# Patient Record
Sex: FEMALE | Race: WHITE | NOT HISPANIC OR LATINO | Employment: FULL TIME | URBAN - METROPOLITAN AREA
[De-identification: names, ages, dates, MRNs, and addresses within clinical notes are randomized per-mention and may not be internally consistent; named-entity substitution may affect disease eponyms.]

---

## 2017-03-01 ENCOUNTER — OFFICE VISIT (OUTPATIENT)
Dept: GASTROENTEROLOGY | Facility: CLINIC | Age: 22
End: 2017-03-01

## 2017-03-01 VITALS
WEIGHT: 180 LBS | BODY MASS INDEX: 30.73 KG/M2 | DIASTOLIC BLOOD PRESSURE: 74 MMHG | HEIGHT: 64 IN | SYSTOLIC BLOOD PRESSURE: 116 MMHG

## 2017-03-01 DIAGNOSIS — R10.30 LOWER ABDOMINAL PAIN: ICD-10-CM

## 2017-03-01 DIAGNOSIS — R19.7 DIARRHEA, UNSPECIFIED TYPE: Primary | ICD-10-CM

## 2017-03-01 PROCEDURE — 99204 OFFICE O/P NEW MOD 45 MIN: CPT | Performed by: INTERNAL MEDICINE

## 2017-03-01 RX ORDER — CHOLECALCIFEROL (VITAMIN D3) 125 MCG
5 CAPSULE ORAL
Status: ON HOLD | COMMUNITY
End: 2017-06-12

## 2017-03-01 RX ORDER — ALPRAZOLAM 0.25 MG/1
0.25 TABLET ORAL 2 TIMES DAILY PRN
COMMUNITY

## 2017-03-01 RX ORDER — FLUOXETINE HYDROCHLORIDE 20 MG/1
20 CAPSULE ORAL DAILY
COMMUNITY

## 2017-03-01 NOTE — PROGRESS NOTES
Chief Complaint   Patient presents with   • Abdominal Pain   • Diarrhea     urgency        History of Present Illness: She had periumbilical pain and loose bowels that started before her wedding in . Now no abdominal pain but she is having frequently and with urgency.   Now 2-3 or more BM/day.  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 + nauusea but no vomiting. No fevers, chills, night sweats. She has gained weight. Last antibiotics > 1 yr ago.  No foreign travel.  Her Mom has crohn's disease. LMP late ; . No association of any of her GI c/o with her menses.    Past Medical History   Diagnosis Date   • Anxiety    • Depression        Past Surgical History   Procedure Laterality Date   • Tonsillectomy           Current Outpatient Prescriptions:   •  ALPRAZolam (XANAX) 0.25 MG tablet, Take 0.25 mg by mouth 2 (Two) Times a Day As Needed for anxiety., Disp: , Rfl:   •  FLUoxetine (PROzac) 20 MG capsule, Take 20 mg by mouth Daily., Disp: , Rfl:   •  hyoscyamine (LEVSIN) 0.125 MG SL tablet, TK 1 T PO Q 4 H PRF CRAMPING OR DIARRHEA, Disp: , Rfl: 1  •  melatonin 5 MG tablet tablet, Take 5 mg by mouth., Disp: , Rfl:     Allergies   Allergen Reactions   • Biaxin [Clarithromycin]        Family History   Problem Relation Age of Onset   • Crohn's disease Mother        Social History     Social History   • Marital status:      Spouse name: N/A   • Number of children: N/A   • Years of education: N/A     Occupational History   • Not on file.     Social History Main Topics   • Smoking status: Never Smoker   • Smokeless tobacco: Never Used   • Alcohol use Yes   • Drug use: No   • Sexual activity: Not on file     Other Topics Concern   • Not on file     Social History Narrative   • No narrative on file       Review of Systems   Gastrointestinal: Positive for abdominal pain and diarrhea.   All other systems reviewed and are negative.      Vitals:     03/01/17 1342   BP: 116/74       Physical Exam   Constitutional: She is oriented to person, place, and time. She appears well-developed and well-nourished. No distress.   HENT:   Head: Normocephalic and atraumatic. Hair is normal.   Right Ear: Hearing, tympanic membrane, external ear and ear canal normal. No drainage. No decreased hearing is noted.   Left Ear: Hearing, tympanic membrane, external ear and ear canal normal. No decreased hearing is noted.   Nose: No nasal deformity.   Mouth/Throat: Oropharynx is clear and moist.   Eyes: Conjunctivae, EOM and lids are normal. Pupils are equal, round, and reactive to light. Right eye exhibits no discharge. Left eye exhibits no discharge.   Neck: Normal range of motion. Neck supple. No JVD present. No tracheal deviation present. No thyromegaly present.   Cardiovascular: Normal rate, regular rhythm, normal heart sounds, intact distal pulses and normal pulses.  Exam reveals no gallop and no friction rub.    No murmur heard.  Pulmonary/Chest: Effort normal and breath sounds normal. No respiratory distress. She has no wheezes. She has no rales. She exhibits no tenderness.   Abdominal: Soft. Bowel sounds are normal. She exhibits no distension and no mass. There is no tenderness. There is no rebound and no guarding. No hernia.   Genitourinary: Rectal exam shows guaiac negative stool.   Genitourinary Comments: Normal anorectal exam.   Musculoskeletal: Normal range of motion. She exhibits no edema, tenderness or deformity.   Lymphadenopathy:     She has no cervical adenopathy.   Neurological: She is alert and oriented to person, place, and time. She has normal reflexes. She displays normal reflexes. No cranial nerve deficit. She exhibits normal muscle tone. Coordination normal.   Skin: Skin is warm and dry. No rash noted. She is not diaphoretic. No erythema.   Psychiatric: She has a normal mood and affect. Her behavior is normal. Judgment and thought content normal.   Vitals  reviewed.      Neva was seen today for abdominal pain and diarrhea.    Diagnoses and all orders for this visit:    Diarrhea, unspecified type  -     Clostridium Difficile Toxin  -     Ova & Parasite Examination  -     Stool Culture  -     Calprotectin, Fecal  -     Fecal Fat, Qualitative  -     Fecal Leukocytes    Lower abdominal pain  -     Clostridium Difficile Toxin  -     Ova & Parasite Examination  -     Stool Culture  -     Calprotectin, Fecal  -     Fecal Fat, Qualitative  -     Fecal Leukocytes       Assessment:  1) Diarrhea  2) Periumbilical pain  3) FH (mom) crohn's disease.    Recommendations:  1)  Stool studies  2) Labs: to be done by her PCP tomorrow: CBC, CMP, TSH, UA, celiac sprue antibody panel.  3) The patient will call me one week after handing in her stool studies. If the tests are unrevealing then consider a c/s.      No Follow-up on file.

## 2017-03-13 ENCOUNTER — TELEPHONE (OUTPATIENT)
Dept: GASTROENTEROLOGY | Facility: CLINIC | Age: 22
End: 2017-03-13

## 2017-03-13 NOTE — TELEPHONE ENCOUNTER
----- Message from Eugenia Zuleta sent at 3/13/2017  9:15 AM EDT -----  Regarding: ORDER FOR STOOLS   Contact: [phone]  PT CALLED NEED ORDER FOR STOOL AND NEED TO FAX TO # [phone]

## 2017-03-13 NOTE — TELEPHONE ENCOUNTER
Stool orders faxed to number requested [phone] and fax confirmation received and scanned under media tab.

## 2017-03-22 ENCOUNTER — TELEPHONE (OUTPATIENT)
Dept: GASTROENTEROLOGY | Facility: CLINIC | Age: 22
End: 2017-03-22

## 2017-03-22 NOTE — TELEPHONE ENCOUNTER
Please tell her that all of her stool studies looked good.  I thought that her primary care doctor was going to be doing lab work on her after I saw her in the office.  Could we get the results of her lab work.  Also could we schedule her for a colonoscopy as an outpatient.  If she is okay with scheduling colonoscopy please order and I will cosign it.  I would like it to be done before the end of the month.

## 2017-03-22 NOTE — TELEPHONE ENCOUNTER
----- Message from Eugenia Zuleta sent at 3/22/2017  8:20 AM EDT -----  Regarding: STOOL RESULTS   Contact: [phone]  PT CALLED FOR STOOL STUDIES REPORT. PT FAX ON 3-20-17

## 2017-03-22 NOTE — TELEPHONE ENCOUNTER
Call to pt.  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  Request pt  fax to [phone] - awaiting results.

## 2017-03-23 ENCOUNTER — PREP FOR SURGERY (OUTPATIENT)
Dept: GASTROENTEROLOGY | Facility: CLINIC | Age: 22
End: 2017-03-23

## 2017-03-23 DIAGNOSIS — R10.13 DYSPEPSIA: Primary | ICD-10-CM

## 2017-03-23 DIAGNOSIS — R19.7 DIARRHEA, UNSPECIFIED TYPE: ICD-10-CM

## 2017-03-23 RX ORDER — SODIUM CHLORIDE 0.9 % (FLUSH) 0.9 %
1-10 SYRINGE (ML) INJECTION AS NEEDED
Status: CANCELLED | OUTPATIENT
Start: 2017-03-23

## 2017-03-23 NOTE — TELEPHONE ENCOUNTER
Please tell her that the lab work done at her PCPs office came back looking good.  Also please schedule her for an EGD and a colonoscopy to be done as an outpatient.  I ordered this in Epic.  Thanks

## 2017-03-23 NOTE — TELEPHONE ENCOUNTER
Call to pt.  Advise per Dr Gonzalez that all of her stool studies looked good.  Dr Gonzalez would like to proceed with c/s.  Pt agreeable and reports that PCP has placed on 3 mo's trial of Lansoprazole for GERD, and so is recommending an EGD along with c/s.    Additional blood work from PCP under Media Tab dated 3/10/17, titled [facility] R....    Message to Dr Gonzalez re: adding EGD to c/s.

## 2017-03-24 NOTE — TELEPHONE ENCOUNTER
Call to pt.  Advise per DR Gonzalez that the lab work done at PCP's office came back looking good.  EGD and c/s have been ordered.  Advise pt she will hear from Scheduling to arrange this. Pt verb understanding.

## 2017-04-18 ENCOUNTER — TELEPHONE (OUTPATIENT)
Dept: GASTROENTEROLOGY | Facility: CLINIC | Age: 22
End: 2017-04-18

## 2017-04-18 NOTE — TELEPHONE ENCOUNTER
Whether or not FMLA papers will be completed by Dr. Gonzalez is up to him since pt has only been evaluated once.  I am not sure what our office policy is when the patient is only been seen one time.  If she could have those completed by her PCP, or whoever has seen her more frequently, that might be more appropriate with only one office visit and our work up not being complete as c-scope has not been completed yet.  Refilling hyoscyamine is not a problem and we can certainly do that until her evaluation is complete with a colonoscopy to determine if additional medications are needed based upon endoscopic findings

## 2017-04-18 NOTE — TELEPHONE ENCOUNTER
Called pt and advised per  SHERLYN Coronado Np that it will be up to Dr Gonzalez whether or not he will fill out fmla papers due to she has only been seen in office once.  Advised the pt will refill her hyoscyamine to get her by till her scope  Advised she may want to see if her pcp can fill out the fmla papers.  Pt verb understanding.

## 2017-04-18 NOTE — TELEPHONE ENCOUNTER
----- Message from Neva Tijerina sent at 4/18/2017 10:56 AM EDT -----  Regarding: Visit Follow-Up Question  Contact: [phone]  Hello,   Due to my abdominal pain and diarrhea, I have had to miss several days of work. I would like to discuss possible FMLA for this. I do not know if I should wait until after my colonoscopy to request this, but since my colonoscopy is not until June I wanted to request this now. I am constantly either coming in late or calling in due to abdominal pain and diarrhea. If someone could call me or message me I would appreciate it.   Also, my primary care office prescribed me Hyoscyamine 0.125 mg sublingual tablets PRN for my abdominal pain and diarrhea. I will need a refill on this soon and since I am now seeing Dr. Gonzalez for these issues, Dr. Quiroga prefers that I request a refill of this from Dr. Gonzalez.   Please contact me via [employer] or cell [phone]  Thank you.  Neva Tijerina

## 2017-05-03 ENCOUNTER — TELEPHONE (OUTPATIENT)
Dept: GASTROENTEROLOGY | Facility: CLINIC | Age: 22
End: 2017-05-03

## 2017-06-12 ENCOUNTER — HOSPITAL ENCOUNTER (OUTPATIENT)
Facility: HOSPITAL | Age: 22
Setting detail: HOSPITAL OUTPATIENT SURGERY
Discharge: HOME | End: 2017-06-12
Attending: INTERNAL MEDICINE | Admitting: INTERNAL MEDICINE

## 2017-06-12 ENCOUNTER — ANESTHESIA EVENT (OUTPATIENT)
Dept: GASTROENTEROLOGY | Facility: HOSPITAL | Age: 22
End: 2017-06-12

## 2017-06-12 ENCOUNTER — ANESTHESIA (OUTPATIENT)
Dept: GASTROENTEROLOGY | Facility: HOSPITAL | Age: 22
End: 2017-06-12

## 2017-06-12 VITALS
RESPIRATION RATE: 20 BRPM | HEART RATE: 64 BPM | SYSTOLIC BLOOD PRESSURE: 110 MMHG | BODY MASS INDEX: 32.44 KG/M2 | WEIGHT: 190 LBS | TEMPERATURE: 98.2 F | HEIGHT: 64 IN | DIASTOLIC BLOOD PRESSURE: 72 MMHG | OXYGEN SATURATION: 98 %

## 2017-06-12 DIAGNOSIS — R10.13 DYSPEPSIA: ICD-10-CM

## 2017-06-12 DIAGNOSIS — R19.7 DIARRHEA, UNSPECIFIED TYPE: ICD-10-CM

## 2017-06-12 LAB
B-HCG UR QL: NEGATIVE
INTERNAL NEGATIVE CONTROL: NEGATIVE
INTERNAL POSITIVE CONTROL: POSITIVE
Lab: NORMAL

## 2017-06-12 PROCEDURE — 88305 TISSUE EXAM BY PATHOLOGIST: CPT | Performed by: INTERNAL MEDICINE

## 2017-06-12 PROCEDURE — 87081 CULTURE SCREEN ONLY: CPT | Performed by: INTERNAL MEDICINE

## 2017-06-12 PROCEDURE — 25010000002 PROPOFOL 10 MG/ML EMULSION: Performed by: ANESTHESIOLOGY

## 2017-06-12 PROCEDURE — 43239 EGD BIOPSY SINGLE/MULTIPLE: CPT | Performed by: INTERNAL MEDICINE

## 2017-06-12 PROCEDURE — 88312 SPECIAL STAINS GROUP 1: CPT | Performed by: INTERNAL MEDICINE

## 2017-06-12 PROCEDURE — 45380 COLONOSCOPY AND BIOPSY: CPT | Performed by: INTERNAL MEDICINE

## 2017-06-12 RX ORDER — LIDOCAINE HYDROCHLORIDE 20 MG/ML
INJECTION, SOLUTION INFILTRATION; PERINEURAL AS NEEDED
Status: DISCONTINUED | OUTPATIENT
Start: 2017-06-12 | End: 2017-06-12 | Stop reason: SURG

## 2017-06-12 RX ORDER — SODIUM CHLORIDE 0.9 % (FLUSH) 0.9 %
1-10 SYRINGE (ML) INJECTION AS NEEDED
Status: DISCONTINUED | OUTPATIENT
Start: 2017-06-12 | End: 2017-06-12 | Stop reason: HOSPADM

## 2017-06-12 RX ORDER — SODIUM CHLORIDE 0.9 % (FLUSH) 0.9 %
3 SYRINGE (ML) INJECTION AS NEEDED
Status: DISCONTINUED | OUTPATIENT
Start: 2017-06-12 | End: 2017-06-12 | Stop reason: HOSPADM

## 2017-06-12 RX ORDER — PROPOFOL 10 MG/ML
VIAL (ML) INTRAVENOUS AS NEEDED
Status: DISCONTINUED | OUTPATIENT
Start: 2017-06-12 | End: 2017-06-12 | Stop reason: SURG

## 2017-06-12 RX ORDER — LIDOCAINE HYDROCHLORIDE 10 MG/ML
0.5 INJECTION, SOLUTION INFILTRATION; PERINEURAL ONCE AS NEEDED
Status: DISCONTINUED | OUTPATIENT
Start: 2017-06-12 | End: 2017-06-12 | Stop reason: HOSPADM

## 2017-06-12 RX ORDER — PROPOFOL 10 MG/ML
VIAL (ML) INTRAVENOUS CONTINUOUS PRN
Status: DISCONTINUED | OUTPATIENT
Start: 2017-06-12 | End: 2017-06-12 | Stop reason: SURG

## 2017-06-12 RX ORDER — SODIUM CHLORIDE, SODIUM LACTATE, POTASSIUM CHLORIDE, CALCIUM CHLORIDE 600; 310; 30; 20 MG/100ML; MG/100ML; MG/100ML; MG/100ML
1000 INJECTION, SOLUTION INTRAVENOUS CONTINUOUS PRN
Status: DISCONTINUED | OUTPATIENT
Start: 2017-06-12 | End: 2017-06-12 | Stop reason: HOSPADM

## 2017-06-12 RX ORDER — NORETHINDRONE ACETATE AND ETHINYL ESTRADIOL .03; 1.5 MG/1; MG/1
TABLET ORAL DAILY
COMMUNITY

## 2017-06-12 RX ADMIN — LIDOCAINE HYDROCHLORIDE 100 MG: 20 INJECTION, SOLUTION INFILTRATION; PERINEURAL at 17:08

## 2017-06-12 RX ADMIN — PROPOFOL 100 MG: 10 INJECTION, EMULSION INTRAVENOUS at 17:14

## 2017-06-12 RX ADMIN — SODIUM CHLORIDE, POTASSIUM CHLORIDE, SODIUM LACTATE AND CALCIUM CHLORIDE: 600; 310; 30; 20 INJECTION, SOLUTION INTRAVENOUS at 17:02

## 2017-06-12 RX ADMIN — PROPOFOL 200 MG: 10 INJECTION, EMULSION INTRAVENOUS at 17:08

## 2017-06-12 RX ADMIN — SODIUM CHLORIDE, POTASSIUM CHLORIDE, SODIUM LACTATE AND CALCIUM CHLORIDE 1000 ML: 600; 310; 30; 20 INJECTION, SOLUTION INTRAVENOUS at 15:47

## 2017-06-12 RX ADMIN — PROPOFOL 100 MG: 10 INJECTION, EMULSION INTRAVENOUS at 17:11

## 2017-06-12 RX ADMIN — PROPOFOL 180 MCG/KG/MIN: 10 INJECTION, EMULSION INTRAVENOUS at 17:08

## 2017-06-12 NOTE — H&P
Chief Complaint   Patient presents with   • Abdominal Pain   • Diarrhea       urgency          History of Present Illness: She had periumbilical pain and loose bowels that started before her wedding in . Now no abdominal pain but she is having frequently and with urgency. Now 2-3 or more BM/day. 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 + nauusea but no vomiting. No fevers, chills, night sweats. She has gained weight. Last antibiotics > 1 yr ago. No foreign travel. Her Mom has crohn's disease. LMP late ; . No association of any of her GI c/o with her menses.      Medical History         Past Medical History   Diagnosis Date   • Anxiety     • Depression               Surgical History          Past Surgical History   Procedure Laterality Date   • Tonsillectomy                   Current Outpatient Prescriptions:   • ALPRAZolam (XANAX) 0.25 MG tablet, Take 0.25 mg by mouth 2 (Two) Times a Day As Needed for anxiety., Disp: , Rfl:   • FLUoxetine (PROzac) 20 MG capsule, Take 20 mg by mouth Daily., Disp: , Rfl:   • hyoscyamine (LEVSIN) 0.125 MG SL tablet, TK 1 T PO Q 4 H PRF CRAMPING OR DIARRHEA, Disp: , Rfl: 1  • melatonin 5 MG tablet tablet, Take 5 mg by mouth., Disp: , Rfl:           Allergies   Allergen Reactions   • Biaxin [Clarithromycin]                 Family History   Problem Relation Age of Onset   • Crohn's disease Mother            Social History    Social History            Social History   • Marital status:        Spouse name: N/A   • Number of children: N/A   • Years of education: N/A          Occupational History   • Not on file.           Social History Main Topics   • Smoking status: Never Smoker   • Smokeless tobacco: Never Used   • Alcohol use Yes   • Drug use: No   • Sexual activity: Not on file           Other Topics Concern   • Not on file      Social History Narrative   • No narrative on file            Review of Systems    Gastrointestinal: Positive for abdominal pain and diarrhea.   All other systems reviewed and are negative.            Vitals:     03/01/17 1342   BP: 116/74         Physical Exam   Constitutional: She is oriented to person, place, and time. She appears well-developed and well-nourished. No distress.   HENT:   Head: Normocephalic and atraumatic. Hair is normal.   Right Ear: Hearing, tympanic membrane, external ear and ear canal normal. No drainage. No decreased hearing is noted.   Left Ear: Hearing, tympanic membrane, external ear and ear canal normal. No decreased hearing is noted.   Nose: No nasal deformity.   Mouth/Throat: Oropharynx is clear and moist.   Eyes: Conjunctivae, EOM and lids are normal. Pupils are equal, round, and reactive to light. Right eye exhibits no discharge. Left eye exhibits no discharge.   Neck: Normal range of motion. Neck supple. No JVD present. No tracheal deviation present. No thyromegaly present.   Cardiovascular: Normal rate, regular rhythm, normal heart sounds, intact distal pulses and normal pulses. Exam reveals no gallop and no friction rub.   No murmur heard.  Pulmonary/Chest: Effort normal and breath sounds normal. No respiratory distress. She has no wheezes. She has no rales. She exhibits no tenderness.   Abdominal: Soft. Bowel sounds are normal. She exhibits no distension and no mass. There is no tenderness. There is no rebound and no guarding. No hernia.   Genitourinary: Rectal exam shows guaiac negative stool.   Genitourinary Comments: Normal anorectal exam.   Musculoskeletal: Normal range of motion. She exhibits no edema, tenderness or deformity.   Lymphadenopathy:   She has no cervical adenopathy.   Neurological: She is alert and oriented to person, place, and time. She has normal reflexes. She displays normal reflexes. No cranial nerve deficit. She exhibits normal muscle tone. Coordination normal.   Skin: Skin is warm and dry. No rash noted. She is not diaphoretic. No  erythema.   Psychiatric: She has a normal mood and affect. Her behavior is normal. Judgment and thought content normal.   Vitals reviewed.        Neva was seen today for abdominal pain and diarrhea.     Diagnoses and all orders for this visit:     Diarrhea, unspecified type  - Clostridium Difficile Toxin  - Ova & Parasite Examination  - Stool Culture  - Calprotectin, Fecal  - Fecal Fat, Qualitative  - Fecal Leukocytes     Lower abdominal pain  - Clostridium Difficile Toxin  - Ova & Parasite Examination  - Stool Culture  - Calprotectin, Fecal  - Fecal Fat, Qualitative  - Fecal Leukocytes        Assessment:  1) Diarrhea  2) Periumbilical pain  3) FH (mom) crohn's disease.     Recommendations:  1) Stool studies  2) Labs: to be done by her PCP tomorrow: CBC, CMP, TSH, UA, celiac sprue antibody panel.  3) The patient will call me one week after handing in her stool studies. If the tests are unrevealing then consider a c/s.     6/12/17 - No change from the above H and P. Frandy Gonzalez M.D.

## 2017-06-12 NOTE — ANESTHESIA POSTPROCEDURE EVALUATION
Patient: Neva Tijerina    Procedure Summary     Date Anesthesia Start Anesthesia Stop Room / Location    06/12/17 1702 0616  [facility] /  [facility]       Procedure Diagnosis Surgeon Provider    ESOPHAGOGASTRODUODENOSCOPY with biopsies (N/A Esophagus); COLONOSCOPY into cecum and T.I. with biopsies (N/A ) Dyspepsia; Diarrhea, unspecified type  (Dyspepsia [R10.13]; Diarrhea, unspecified type [R19.7]) MD Wolfgang Mccray MD          Anesthesia Type: MAC  Last vitals  BP      Temp      Pulse     Resp      SpO2        Post Anesthesia Care and Evaluation    Patient location during evaluation: PACU  Patient participation: complete - patient participated  Level of consciousness: awake and alert  Pain score: 0  Pain management: adequate  Airway patency: patent  Anesthetic complications: No anesthetic complications    Cardiovascular status: acceptable  Respiratory status: acceptable  Hydration status: acceptable

## 2017-06-12 NOTE — DISCHARGE INSTRUCTIONS
For the next 24 hours patient needs to be with a responsible adult.    For 24 hours DO NOT drive, operate machinery, appliances, drink alcohol, make important decisions or sign legal documents.    Start with a light or bland diet and advance to regular diet as tolerated.    Follow recommendations on procedure report provided by your doctor.    Call Dr Gonzalez for problems [phone]. Call for pathology results in 2 weeks.    Problems may include but not limited to: large amounts of bleeding, trouble breathing, repeated vomiting, severe unrelieved pain, fever or chills.

## 2017-06-13 LAB — UREASE TISS QL: NEGATIVE

## 2017-06-14 LAB
CYTO UR: NORMAL
LAB AP CASE REPORT: NORMAL
Lab: NORMAL
PATH REPORT.FINAL DX SPEC: NORMAL
PATH REPORT.GROSS SPEC: NORMAL

## 2017-06-16 ENCOUNTER — TELEPHONE (OUTPATIENT)
Dept: GASTROENTEROLOGY | Facility: CLINIC | Age: 22
End: 2017-06-16

## 2017-06-16 NOTE — TELEPHONE ENCOUNTER
----- Message from Neva Tijerina sent at 6/16/2017  1:56 PM EDT -----  Regarding: Visit Follow-Up Question  Contact: [phone]  Hello,     I saw my results were on MyChart from my Colonoscopy 6/12/17. Can someone please call me once Dr. Gonzalez has reviewed these and let me know what he says? So far, what I am reading is that everything is normal, just some inflammation. So this is good but what does this mean? IBS? I have 4 week follow up with ROMI Rand on 7/10/17.     Thank you.     P.S: YAY NO CROHN'S! :)     -Neva Tijerina

## 2017-06-19 NOTE — PROGRESS NOTES
"Please tell her that pathology from the EGD looked okay.  There was minimal inflammation in the stomach.  The biopsies of the end of the small bowel and the colon in general looked okay.  The rectal biopsy showed \"mild architectural distortion\" which probably means nothing.  It rarely committed be seen in something like ulcerative colitis that is very much in remission.  If she wants to discuss with may have her see me in the office in follow-up."

## 2017-06-21 ENCOUNTER — TELEPHONE (OUTPATIENT)
Dept: GASTROENTEROLOGY | Facility: CLINIC | Age: 22
End: 2017-06-21

## 2017-06-21 NOTE — TELEPHONE ENCOUNTER
"----- Message from Frandy Gonzalez MD sent at 6/19/2017  8:24 AM EDT -----  Please tell her that pathology from the EGD looked okay.  There was minimal inflammation in the stomach.  The biopsies of the end of the small bowel and the colon in general looked okay.  The rectal biopsy showed \"mild architectural distortion\" which probably means nothing.  It rarely committed be seen in something like ulcerative colitis that is very much in remission.  If she wants to discuss with may have her see me in the office in follow-up.  "

## 2017-06-21 NOTE — TELEPHONE ENCOUNTER
Pt called and advised per SHALA Gonzalez that the path from the egd looked ok.  There were min inflammation in the stomach.  The bx of the end of the sm bowel and the colon in general looked ok.  The rectal bx showed mild architectural distortion which probably means nothing.  It rarely can be seen in something like ulcerative colitis that is very much in remission.  If she wants to discuss , she can f/u in office. Pt verb understanding and states she will keep her f/u in July.

## 2017-07-10 ENCOUNTER — OFFICE VISIT (OUTPATIENT)
Dept: GASTROENTEROLOGY | Facility: CLINIC | Age: 22
End: 2017-07-10

## 2017-07-10 VITALS
HEIGHT: 63 IN | BODY MASS INDEX: 34.55 KG/M2 | TEMPERATURE: 98.4 F | DIASTOLIC BLOOD PRESSURE: 66 MMHG | WEIGHT: 195 LBS | SYSTOLIC BLOOD PRESSURE: 118 MMHG

## 2017-07-10 DIAGNOSIS — K58.2 IRRITABLE BOWEL SYNDROME WITH BOTH CONSTIPATION AND DIARRHEA: Primary | ICD-10-CM

## 2017-07-10 PROCEDURE — 99213 OFFICE O/P EST LOW 20 MIN: CPT | Performed by: NURSE PRACTITIONER

## 2017-07-10 RX ORDER — LANSOPRAZOLE 30 MG/1
CAPSULE, DELAYED RELEASE ORAL
COMMUNITY
Start: 2017-05-09

## 2017-07-10 NOTE — PROGRESS NOTES
Chief Complaint   Patient presents with   • Abdominal Pain   • Constipation   • Diarrhea         HPI    22-year-old female patient of Dr. Gonzalez's last evaluated in the office in March 2017.  At that time seen for complaints of periodic umbilical pain, episodes of constipation alternating with diarrhea associated with frequency and urgency.  Mom has a history of Crohn's.  Stool studies were done and labs were obtained from the patient's primary care provider to include a CBC, CMP, UA, TSH and celiac panel which were all within normal limits.  Patient underwent both an EGD as well as a colonoscopy on Jenni 10 for evaluation of complaints and presents today for follow-up.    EGD with findings of gastritis with negative pathology.  Colonoscopy performed at the same time with findings of normal perianal and digital rectal exam, normal sphincter tone, normal terminal ileum, and normal-appearing colon.  Pathology was consistent with no celiac, H pylori and colon biopsies at the rectum showed mild architectural distortion with no inflammation, fibrosis ulceration or granuloma and no indication of IBD.    Patient continues with intermittent episodes of diarrhea alternating with constipation.  She reports her stools will be loose primarily when she is under a lot of stress.  She is attempting to manage her symptoms with healthful eating, increase in her activity and exercise regimen and stress management's last relaxation.  She is compliant with her Levsin taken on an as-needed basis as well as her lansoprazole.    Past Medical History:   Diagnosis Date   • Anxiety    • Depression    • GERD (gastroesophageal reflux disease)      Past Surgical History:   Procedure Laterality Date   • COLONOSCOPY N/A 6/12/2017    Procedure: COLONOSCOPY into cecum and T.I. with biopsies;  Surgeon: Frandy Gonzalez MD;  Location: [facility] ENDOSCOPY;  Service: mild architectural distortion,    • ENDOSCOPY N/A 6/12/2017    Procedure:  "ESOPHAGOGASTRODUODENOSCOPY with biopsies;  Surgeon: Frandy Gonzalez MD;  Location: [facility] ENDOSCOPY;  Service: chronic inflammation   • TONSILLECTOMY     • WISDOM TOOTH EXTRACTION         Current Outpatient Prescriptions   Medication Sig Dispense Refill   • ALPRAZolam (XANAX) 0.25 MG tablet Take 0.25 mg by mouth 2 (Two) Times a Day As Needed for anxiety.     • FLUoxetine (PROzac) 20 MG capsule Take 20 mg by mouth Daily.     • hyoscyamine (LEVSIN) 0.125 MG SL tablet Take 1 tablet by mouth Every 4 (Four) Hours As Needed for Cramping (cramping or diarrhea). 60 tablet 1   • lansoprazole (PREVACID) 30 MG capsule Every Other Day As Needed.     • Norethindrone Acet-Ethinyl Est (MICROGESTIN) 1.5-30 MG-MCG tablet Take  by mouth Daily.       No current facility-administered medications for this visit.        PRN Meds:.    Allergies   Allergen Reactions   • Biaxin [Clarithromycin]        Social History     Social History   • Marital status:      Spouse name: N/A   • Number of children: N/A   • Years of education: N/A     Occupational History   • Not on file.     Social History Main Topics   • Smoking status: Never Smoker   • Smokeless tobacco: Never Used   • Alcohol use 0.6 oz/week     1 Shots of liquor per week   • Drug use: No   • Sexual activity: Not on file     Other Topics Concern   • Not on file     Social History Narrative       Family History   Problem Relation Age of Onset   • Crohn's disease Mother        Review of Systems   Constitutional: Negative for activity change, appetite change and unexpected weight change.   Gastrointestinal: Positive for constipation and diarrhea. Negative for abdominal distention, abdominal pain, blood in stool, nausea and vomiting.       Vitals:    07/10/17 1334   BP: 118/66   Temp: 98.4 °F (36.9 °C)     /66  Temp 98.4 °F (36.9 °C)  Ht 63\" (160 cm)  Wt 195 lb (88.5 kg)  LMP 05/12/2017  BMI 34.54 kg/m2  Physical Exam   Constitutional: She is oriented to person, place, and " time. She appears well-developed and well-nourished. No distress.   HENT:   Head: Normocephalic and atraumatic.   Eyes: Left eye exhibits no discharge.   Cardiovascular: Normal rate and regular rhythm.    Pulmonary/Chest: Effort normal and breath sounds normal.   Abdominal: Soft. Bowel sounds are normal.   obese   Neurological: She is alert and oriented to person, place, and time.   Skin: Skin is warm and dry.   Psychiatric: She has a normal mood and affect.   Vitals reviewed.      ASSESSMENT AND PLAN    Neva was seen today for abdominal pain, constipation and diarrhea.    Diagnoses and all orders for this visit:    Irritable bowel syndrome with both constipation and diarrhea  Comments:  c-scope 6/2017 negative      Both EGD and colonoscopy as well as pathology were reviewed with the patient and her mother.  There is no indication of irritable bowel disease.  We discussed diagnosis of irritable bowel syndrome with mixed pattern as she has diarrhea alternating with constipation.  Treatment is symptom management, she is currently attempting to follow a more healthful diet as well as increase her activity level which will be beneficial.  We have discussed increasing dietary fiber and certainly management of stress levels which directly correlate with her IBS sxs.  Can continue the Levsin on an as-needed basis and take lansoprazole for mild GERD symptoms.  She will return to the office for routine follow-up in one year as we are prescribing medications.  She can contact us sooner if there are any concerns or changes from today's assessment        AMINAH Latif   [facility]  [address] [place] [address]  Office: [phone]

## 2018-03-07 ENCOUNTER — TELEPHONE (OUTPATIENT)
Dept: GASTROENTEROLOGY | Facility: CLINIC | Age: 23
End: 2018-03-07

## 2018-03-07 NOTE — TELEPHONE ENCOUNTER
----- Message from Neva Tijerina sent at 3/6/2018  6:04 PM EST -----  Regarding: Non-Urgent Medical Question  Contact: [phone]  Dr. Gonzalez or Ms. Rees,    I am currently 16 weeks pregnant and having issues with my IBS flaring up. My OB recommended that I ask you about Bentyl for my IBS while I am currently pregnant. I typically take Hycosamine but she is not sure about me taking that while pregnant. Is there any way you can prescribe me Bentyl for the time being? If so can you please send to [facility] on [address].

## 2018-03-08 NOTE — TELEPHONE ENCOUNTER
I called her yesterday and again today but could only leave a message. Why don't you work her in to see ROMI Hull. [email or web address]

## 2018-03-09 NOTE — TELEPHONE ENCOUNTER
Call to pt.  Advise that Dr Gonzalez has tried to contact without success - wants pt to be seen in office.    Pt verb understanding.  Requesting appt during spring break - scheduled for 4/2 @ 1:30 pm.

## 2018-04-02 ENCOUNTER — OFFICE VISIT (OUTPATIENT)
Dept: GASTROENTEROLOGY | Facility: CLINIC | Age: 23
End: 2018-04-02

## 2018-04-02 VITALS
BODY MASS INDEX: 32.1 KG/M2 | DIASTOLIC BLOOD PRESSURE: 62 MMHG | SYSTOLIC BLOOD PRESSURE: 102 MMHG | HEIGHT: 64 IN | WEIGHT: 188 LBS | TEMPERATURE: 98.6 F

## 2018-04-02 DIAGNOSIS — Z3A.20 20 WEEKS GESTATION OF PREGNANCY: ICD-10-CM

## 2018-04-02 DIAGNOSIS — K58.2 IRRITABLE BOWEL SYNDROME WITH BOTH CONSTIPATION AND DIARRHEA: Primary | ICD-10-CM

## 2018-04-02 PROCEDURE — 99213 OFFICE O/P EST LOW 20 MIN: CPT | Performed by: NURSE PRACTITIONER

## 2018-04-02 RX ORDER — METOCLOPRAMIDE 5 MG/1
TABLET ORAL
Refills: 0 | COMMUNITY
Start: 2018-03-12

## 2018-04-02 RX ORDER — PNV,CALCIUM 72/IRON/FOLIC ACID 27 MG-1 MG
TABLET ORAL
COMMUNITY
Start: 2017-12-31

## 2018-04-02 RX ORDER — CALCIUM CARBONATE 200(500)MG
1 TABLET,CHEWABLE ORAL DAILY
COMMUNITY

## 2018-04-02 RX ORDER — ACETAMINOPHEN 325 MG/1
650 TABLET ORAL EVERY 6 HOURS PRN
COMMUNITY

## 2018-04-02 RX ORDER — ONDANSETRON HYDROCHLORIDE 8 MG/1
TABLET, FILM COATED ORAL
Refills: 0 | COMMUNITY
Start: 2018-03-09

## 2018-04-02 NOTE — PROGRESS NOTES
Chief Complaint   Patient presents with   • Irritable Bowel Syndrome     pt is pregnant       Neva Tijerina is a  22 y.o. female here for a follow up visit for IBS.    HPI  21 yo f presents today for follow up visit for IBS-mixed. She is a patient of Dr. Gonzalez. She is currently 20 weeks pregnant. She is accompanied by her . She normally reports issues with IBS and diarrhea but since she has been pregnant she is having issues with constipation. She has tried stool softeners but admits she has not taken them regularly. She has been to the ER with impaction and had to have enemas and fluids. She admits she has to strain a lot now to have Bm. She denies any dysphagia, abd pain, N&V, diarrhea, rectal bleeding or melena.     Past Medical History:   Diagnosis Date   • Anxiety    • Depression    • GERD (gastroesophageal reflux disease)        Past Surgical History:   Procedure Laterality Date   • COLONOSCOPY N/A 6/12/2017    normal   • ENDOSCOPY N/A 6/12/2017    Erythema in stomach   • TONSILLECTOMY     • WISDOM TOOTH EXTRACTION         Scheduled Meds:    Continuous Infusions:  No current facility-administered medications for this visit.     PRN Meds:.    Allergies   Allergen Reactions   • Biaxin [Clarithromycin]    • Promethazine Irritability       Social History     Social History   • Marital status:      Spouse name: N/A   • Number of children: N/A   • Years of education: N/A     Occupational History   • Not on file.     Social History Main Topics   • Smoking status: Never Smoker   • Smokeless tobacco: Never Used   • Alcohol use No   • Drug use: No   • Sexual activity: Not on file     Other Topics Concern   • Not on file     Social History Narrative   • No narrative on file       Family History   Problem Relation Age of Onset   • Crohn's disease Mother        Review of Systems   Constitutional: Negative for appetite change, chills, diaphoresis, fatigue, fever and unexpected weight change.   HENT: Negative  for nosebleeds, postnasal drip, sore throat, trouble swallowing and voice change.    Respiratory: Negative for cough, choking, chest tightness, shortness of breath and wheezing.    Cardiovascular: Negative for chest pain.   Gastrointestinal: Positive for constipation. Negative for abdominal distention, abdominal pain, anal bleeding, blood in stool, diarrhea, nausea, rectal pain and vomiting.   Endocrine: Negative for polydipsia, polyphagia and polyuria.   Musculoskeletal: Negative for gait problem.   Skin: Negative for rash and wound.   Allergic/Immunologic: Negative for food allergies.   Neurological: Negative for dizziness, speech difficulty and light-headedness.   Psychiatric/Behavioral: Negative for confusion, self-injury, sleep disturbance and suicidal ideas.       Vitals:    04/02/18 1337   BP: 102/62   Temp: 98.6 °F (37 °C)       Physical Exam   Constitutional: She is oriented to person, place, and time. She appears well-developed and well-nourished. She does not appear ill. No distress.   HENT:   Head: Normocephalic.   Eyes: Pupils are equal, round, and reactive to light.   Cardiovascular: Normal rate, regular rhythm and normal heart sounds.    Pulmonary/Chest: Effort normal and breath sounds normal.   Abdominal: Soft. Bowel sounds are normal. She exhibits no distension and no mass. There is no hepatosplenomegaly. There is no tenderness. There is no rebound and no guarding. No hernia.   Musculoskeletal: Normal range of motion.   Neurological: She is alert and oriented to person, place, and time.   Skin: Skin is warm and dry.   Psychiatric: She has a normal mood and affect. Her speech is normal and behavior is normal. Judgment normal.       No images are attached to the encounter.    Assessment & Plan    1. Irritable bowel syndrome with both constipation and diarrhea    2. 20 weeks gestation of pregnancy    I advised patient to try all natural remedies to help with constipation like increased water, vegetable  based stool softeners, prunes or prune juice or increased fiber. She can discuss with her OBGYN about possibly trying dulcolax stool softeners or miralax. I will also give her some samples of IBGARD to try. Follow up with her OBGYN as planned. Follow up with Dr. Gonzalez as needed.

## 2019-08-29 ENCOUNTER — TRANSCRIBE ORDERS (OUTPATIENT)
Dept: PHYSICAL THERAPY | Facility: CLINIC | Age: 24
End: 2019-08-29

## 2019-08-29 DIAGNOSIS — M54.5 LOW BACK PAIN, UNSPECIFIED BACK PAIN LATERALITY, UNSPECIFIED CHRONICITY, WITH SCIATICA PRESENCE UNSPECIFIED: Primary | ICD-10-CM

## 2019-09-06 ENCOUNTER — OFFICE VISIT (OUTPATIENT)
Dept: PHYSICAL THERAPY | Facility: CLINIC | Age: 24
End: 2019-09-06

## 2019-09-06 DIAGNOSIS — M54.5 LOW BACK PAIN, UNSPECIFIED BACK PAIN LATERALITY, UNSPECIFIED CHRONICITY, WITH SCIATICA PRESENCE UNSPECIFIED: Primary | ICD-10-CM

## 2019-09-06 PROCEDURE — 97014 ELECTRIC STIMULATION THERAPY: CPT | Performed by: PHYSICAL THERAPIST

## 2019-09-06 PROCEDURE — 97161 PT EVAL LOW COMPLEX 20 MIN: CPT | Performed by: PHYSICAL THERAPIST

## 2019-09-06 PROCEDURE — 97110 THERAPEUTIC EXERCISES: CPT | Performed by: PHYSICAL THERAPIST

## 2019-09-06 NOTE — PROGRESS NOTES
Physical Therapy Initial Evaluation and Plan of Care        Subjective Evaluation    History of Present Illness  Date of surgery: 2019  Mechanism of injury: Pt is 23 yo female with onset of back pain after lifting a 200# patient from the floor.  She has complaints of middle upper back pain with leaning over, bending, twisting. No sleep disturbance at night.      Subjective comment: Pt reports she is doing better.   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    Precautions and Work Restrictions: on light dutyQuality of life: good    Pain  Current pain ratin  At best pain ratin  At worst pain ratin  Location: Mid back  Quality: dull ache, tight and discomfort  Relieving factors: medications, rest, change in position and ice  Aggravating factors: lifting, movement, prolonged positioning and outstretched reach (bending, twisting)    Social Support  Lives in: multiple-level home  Lives with: young children and spouse    Hand dominance: right    Treatments  Previous treatment: medication  Current treatment: medication  Patient Goals  Patient goals for therapy: decreased pain, increased motion, increased strength, return to sport/leisure activities and return to work             Objective       Static Posture     Head  Forward.    Shoulders  Rounded.    Thoracic Spine  Hyperkyphosis.    Lumbar Spine   Decreased lordosis.     Knee   Genu valgus.     Palpation   Left   No palpable tenderness to the erector spinae, iliopsoas and quadratus lumborum.   Muscle spasm in the cervical paraspinals, lower trapezius, middle trapezius and rhomboids.   Tenderness of the cervical paraspinals, lower trapezius, middle trapezius and rhomboids.     Right   No palpable tenderness to the erector spinae, iliopsoas and quadratus lumborum.   Muscle spasm in the cervical paraspinals, lower trapezius, middle trapezius and rhomboids. Tenderness of the cervical paraspinals, lower trapezius, middle trapezius and rhomboids.      Active Range of Motion     Additional Active Range of Motion Details  Cervical ROM is 100% of normal  Lumbar AROM %  Flexion 50%  Extension 50% with sharp mid back pain  Right Lateral Flexion 50%  Left Lateral Flexion 50%      Strength/Myotome Testing     Left Shoulder     Planes of Motion   Flexion: 5   Extension: 5   Abduction: 5   External rotation at 0°: 5   Internal rotation at 0°: 5     Right Shoulder     Planes of Motion   Flexion: 5   Extension: 5   Abduction: 5   External rotation at 0°: 5   Internal rotation at 0°: 5     Left Elbow   Flexion: 5  Extension: 5    Right Elbow   Flexion: 5  Extension: 5    Left Wrist/Hand   Wrist flexion: 5    Right Wrist/Hand   Wrist flexion: 5    Left Hip   Planes of Motion   Flexion: 5  Abduction: 5    Right Hip   Planes of Motion   Flexion: 5  Abduction: 5    Left Knee   Flexion: 5  Extension: 5    Right Knee   Flexion: 5  Extension: 5    Left Ankle/Foot   Plantar flexion: 5    Right Ankle/Foot   Plantar flexion: 5         Assessment & Plan     Assessment  Impairments: abnormal muscle tone, abnormal or restricted ROM, activity intolerance and pain with function  Assessment details: Neva Tijerina is a 24 y.o. year-old female referred to physical therapy for mid back pain from lifting a patient from the floor at work. She presents with a stable clinical presentation.  She has no  comorbidities or personal factors that may affect her progress in the plan of care.  She presents with moderate spasm in mid to lower thoracic paraspinals, mid traps, and rhomboids. Her lumbar ROM is slightly decreased; she displays normal lateral flexion curve bilaterally.    Barriers to therapy: none  Prognosis: good  Functional Limitations: carrying objects, lifting, pulling, pushing, stooping and unable to perform repetitive tasks  Goals  Plan Goals: Long Term Goals (4 weeks)    1.  Patient is able to return to work/community activities with minimal to no discomfort  2.  Patient is able to  lift 25 lbs from knee height to waist  3.  Patient is able to assist patients with chair to bed transfers without increased pain.   4.  Patient is able to perform her job duties as needed     Short Term Goals (2 weeks)    Patient is independent with HEP  Patient is able to sleep without being disrupted by pain.   1.  Patient has full AROM/PROM of lumbar spine.  2.  Patient has greater than 50% improvement overall.    3.Patient has minimal to no tenderness to palpation.   4.  Pt will be independent with HEP for improved ROM and decreased pain.                 Plan  Therapy options: will be seen for skilled physical therapy services  Planned modality interventions: cryotherapy, electrical stimulation/Russian stimulation, thermotherapy (hydrocollator packs) and ultrasound  Planned therapy interventions: flexibility, functional ROM exercises, home exercise program, neuromuscular re-education, manual therapy, soft tissue mobilization, spinal/joint mobilization, strengthening, stretching and therapeutic activities  Plan details: Progress in flexibility and strengthening of thoracic spine.         Manual Therapy:         mins  73962;  Therapeutic Exercise:    32     mins  38048;     Neuromuscular Ector:        mins  42369;    Therapeutic Activity:          mins  65286;     Gait Training:           mins  84011;     Ultrasound:          mins  20194;    Work Hardening                 mins 05877  Iontophoresis                  mins 22865  Electrical Stimulation 15 min      Timed Treatment:   32   mins   Total Treatment:     62   mins    PT SIGNATURE: Kenzie Ruiz, PT   DATE TREATMENT INITIATED: 9/6/2019    Initial Certification  Certification Period: 12/5/2019  I certify that the therapy services are furnished while this patient is under my care.  The services outlined above are required by this patient, and will be reviewed every 90 days.     PHYSICIAN: Meredith Flower, AMINAH      DATE:     Please sign and return via  fax to [phone].. Thank you, [facility].

## 2020-08-27 ENCOUNTER — DOCUMENTATION (OUTPATIENT)
Dept: PHYSICAL THERAPY | Facility: CLINIC | Age: 25
End: 2020-08-27

## 2021-04-16 ENCOUNTER — BULK ORDERING (OUTPATIENT)
Dept: CASE MANAGEMENT | Facility: OTHER | Age: 26
End: 2021-04-16

## 2021-04-16 DIAGNOSIS — Z23 IMMUNIZATION DUE: ICD-10-CM

## 2022-09-12 NOTE — ANESTHESIA PREPROCEDURE EVALUATION
Anesthesia Evaluation     Patient summary reviewed and Nursing notes reviewed   no history of anesthetic complications:  NPO Solid Status: > 8 hours  NPO Liquid Status: > 8 hours     Airway   Mallampati: II  TM distance: >3 FB  Neck ROM: full  no difficulty expected  Dental - normal exam     Pulmonary - negative pulmonary ROS and normal exam   Cardiovascular - negative cardio ROS and normal exam  Exercise tolerance: good (4-7 METS)    Rhythm: regular  Rate: normal        Neuro/Psych  (+) psychiatric history Anxiety and Depression,    GI/Hepatic/Renal/Endo    (+) obesity,      Musculoskeletal (-) negative ROS    Abdominal  - normal exam   Substance History - negative use     OB/GYN          Other - negative ROS                                       Anesthesia Plan    ASA 2     MAC     intravenous induction   Anesthetic plan and risks discussed with patient.    Plan discussed with attending.      
98

## (undated) DEVICE — TUBING, SUCTION, 1/4" X 10', STRAIGHT: Brand: MEDLINE

## (undated) DEVICE — Device: Brand: DEFENDO AIR/WATER/SUCTION AND BIOPSY VALVE

## (undated) DEVICE — CANN NASL CO2 TRULINK W/O2 A/

## (undated) DEVICE — THE TORRENT IRRIGATION SCOPE CONNECTOR IS USED WITH THE TORRENT IRRIGATION TUBING TO PROVIDE IRRIGATION FLUIDS SUCH AS STERILE WATER DURING GASTROINTESTINAL ENDOSCOPIC PROCEDURES WHEN USED IN CONJUNCTION WITH AN IRRIGATION PUMP (OR ELECTROSURGICAL UNIT).: Brand: TORRENT

## (undated) DEVICE — BITEBLOCK OMNI BLOC

## (undated) DEVICE — SINGLE-USE BIOPSY FORCEPS: Brand: RADIAL JAW 4